# Patient Record
Sex: MALE | URBAN - METROPOLITAN AREA
[De-identification: names, ages, dates, MRNs, and addresses within clinical notes are randomized per-mention and may not be internally consistent; named-entity substitution may affect disease eponyms.]

---

## 2017-07-17 ENCOUNTER — OUTPATIENT (OUTPATIENT)
Dept: OUTPATIENT SERVICES | Facility: HOSPITAL | Age: 62
LOS: 1 days | Discharge: HOME | End: 2017-07-17

## 2017-07-17 DIAGNOSIS — I10 ESSENTIAL (PRIMARY) HYPERTENSION: ICD-10-CM

## 2017-07-17 DIAGNOSIS — E03.9 HYPOTHYROIDISM, UNSPECIFIED: ICD-10-CM

## 2017-07-17 DIAGNOSIS — E78.2 MIXED HYPERLIPIDEMIA: ICD-10-CM

## 2017-07-17 DIAGNOSIS — D64.9 ANEMIA, UNSPECIFIED: ICD-10-CM

## 2017-07-17 PROBLEM — Z00.00 ENCOUNTER FOR PREVENTIVE HEALTH EXAMINATION: Status: ACTIVE | Noted: 2017-07-17

## 2017-08-04 ENCOUNTER — APPOINTMENT (OUTPATIENT)
Dept: UROLOGY | Facility: CLINIC | Age: 62
End: 2017-08-04
Payer: COMMERCIAL

## 2017-08-04 VITALS
BODY MASS INDEX: 30.78 KG/M2 | HEIGHT: 70 IN | DIASTOLIC BLOOD PRESSURE: 72 MMHG | WEIGHT: 215 LBS | HEART RATE: 60 BPM | SYSTOLIC BLOOD PRESSURE: 140 MMHG

## 2017-08-04 DIAGNOSIS — Z78.9 OTHER SPECIFIED HEALTH STATUS: ICD-10-CM

## 2017-08-04 DIAGNOSIS — Z80.3 FAMILY HISTORY OF MALIGNANT NEOPLASM OF BREAST: ICD-10-CM

## 2017-08-04 DIAGNOSIS — E03.9 HYPOTHYROIDISM, UNSPECIFIED: ICD-10-CM

## 2017-08-04 DIAGNOSIS — E78.00 PURE HYPERCHOLESTEROLEMIA, UNSPECIFIED: ICD-10-CM

## 2017-08-04 DIAGNOSIS — I10 ESSENTIAL (PRIMARY) HYPERTENSION: ICD-10-CM

## 2017-08-04 DIAGNOSIS — Z82.49 FAMILY HISTORY OF ISCHEMIC HEART DISEASE AND OTHER DISEASES OF THE CIRCULATORY SYSTEM: ICD-10-CM

## 2017-08-04 DIAGNOSIS — Z80.52 FAMILY HISTORY OF MALIGNANT NEOPLASM OF BLADDER: ICD-10-CM

## 2017-08-04 PROCEDURE — 99242 OFF/OP CONSLTJ NEW/EST SF 20: CPT

## 2017-08-04 RX ORDER — CARVEDILOL 3.12 MG/1
3.12 TABLET, FILM COATED ORAL
Qty: 180 | Refills: 0 | Status: ACTIVE | COMMUNITY
Start: 2017-05-09

## 2017-08-04 RX ORDER — ATORVASTATIN CALCIUM 10 MG/1
10 TABLET, FILM COATED ORAL
Qty: 90 | Refills: 0 | Status: ACTIVE | COMMUNITY
Start: 2017-05-09

## 2017-08-04 RX ORDER — VALSARTAN 160 MG/1
160 TABLET, COATED ORAL
Qty: 90 | Refills: 0 | Status: ACTIVE | COMMUNITY
Start: 2017-05-09

## 2017-08-04 RX ORDER — LEVOTHYROXINE SODIUM 150 UG/1
150 TABLET ORAL
Qty: 90 | Refills: 0 | Status: ACTIVE | COMMUNITY
Start: 2017-05-09

## 2017-08-10 ENCOUNTER — OTHER (OUTPATIENT)
Age: 62
End: 2017-08-10

## 2017-08-11 ENCOUNTER — OUTPATIENT (OUTPATIENT)
Dept: OUTPATIENT SERVICES | Facility: HOSPITAL | Age: 62
LOS: 1 days | Discharge: HOME | End: 2017-08-11

## 2017-08-11 DIAGNOSIS — N62 HYPERTROPHY OF BREAST: ICD-10-CM

## 2017-08-11 DIAGNOSIS — N50.0 ATROPHY OF TESTIS: ICD-10-CM

## 2017-08-11 DIAGNOSIS — R97.20 ELEVATED PROSTATE SPECIFIC ANTIGEN [PSA]: ICD-10-CM

## 2017-08-25 ENCOUNTER — OUTPATIENT (OUTPATIENT)
Dept: OUTPATIENT SERVICES | Facility: HOSPITAL | Age: 62
LOS: 1 days | Discharge: HOME | End: 2017-08-25

## 2017-08-25 DIAGNOSIS — R97.20 ELEVATED PROSTATE SPECIFIC ANTIGEN [PSA]: ICD-10-CM

## 2017-10-17 ENCOUNTER — OTHER (OUTPATIENT)
Age: 62
End: 2017-10-17

## 2017-10-18 ENCOUNTER — APPOINTMENT (OUTPATIENT)
Dept: UROLOGY | Facility: CLINIC | Age: 62
End: 2017-10-18
Payer: COMMERCIAL

## 2017-10-18 VITALS
DIASTOLIC BLOOD PRESSURE: 69 MMHG | HEIGHT: 70 IN | HEART RATE: 64 BPM | BODY MASS INDEX: 30.78 KG/M2 | SYSTOLIC BLOOD PRESSURE: 132 MMHG | WEIGHT: 215 LBS

## 2017-10-18 DIAGNOSIS — N50.0 ATROPHY OF TESTIS: ICD-10-CM

## 2017-10-18 DIAGNOSIS — N62 HYPERTROPHY OF BREAST: ICD-10-CM

## 2017-10-18 DIAGNOSIS — R97.20 ELEVATED PROSTATE, SPECIFIC ANTIGEN [PSA]: ICD-10-CM

## 2017-10-18 PROCEDURE — 99214 OFFICE O/P EST MOD 30 MIN: CPT

## 2017-10-18 NOTE — LETTER HEADER
[Camden Guaman MD] : Camden Guaman MD [Director of Urology] : Director of Urology [[address]] : [address] [[address]] : [address] [[place] [address]] : [place] [address] [Tel [phone]] : Tel [phone] [Fax [phone]] : Fax [phone]

## 2017-10-18 NOTE — LETTER BODY
[Attached please find my note.] : Attached please find my note. [Thank you very much for allowing me to participate in the care of this patient. If you have any questions, please do not hesitate to contact me] : Thank you very much for allowing me to participate in the care of this patient. If you have any questions, please do not hesitate to contact me. [FreeTextEntry2] : Pradeep Hogue MD\par [address] [place] [address]\[place] [address]

## 2017-10-18 NOTE — ASSESSMENT
[FreeTextEntry1] : At his age these labs give him a less than one in four chance of a positive biopsy and given autopsy data with the incidence of prostate cancer in 62-year-old man to be somewhere in the neighborhood of 50% if not more. Ergo, I question whether it’s worth doing. This is especially so as the PSA prior to this he tells me was 4.7.\par \par The question however is his hypogonadism. That does aggravate any degree a metabolic syndrome and can cause osteopenia or osteoporosis. When I’m going to do especially as if there is a risk of prostate cancer having hypogonadism is one more difficult to treat and two may obfuscate the issue is repeat the labs to see if it’s real. If it is I will get a bone scan to see if there is any evidence of osteopenia. If it is real and especially if there is evidence of osteopenia leading me to want to make his testosterone into the normal range I would ask him to see my partner Dr. De Leon who is our uro- oncologist to see if a prostate biopsy is indicated or could we start testosterone replacement and follow the PSA and see if it changes.\par \par An additional issue is the mall bladder wall thickening with a 40 g prostate. Theoretically we could start him on five alpha reductase inhibitors. One it may give us a vector change in the PSA and if it does would lead us to be more comfortable that this is benign PSA not cancerous. Two, it may actually help his urination and he may not realize how bad it is until we make it better. If we want to consider that I would consider doing a noninvasive evaluation including a record of his intake and output a flow study and checking his post void residual and consider alpha blockade as well. These will be predicated on the findings on the repeat bloods and what we decide to do.

## 2017-10-18 NOTE — HISTORY OF PRESENT ILLNESS
[FreeTextEntry1] : Diego was seen in August for a PSA that was 4.7. He has no history of urologic problems including erections and voiding but had had a urinary tract infection about a year and a half ago. In a family history of bladder cancer Given the above, the testicular atrophy and the mild gynecomastia we sent him for blood and urine tests and if the labs are normal he was going to get a CAT scan. He is here today to review the results and see what if anything needs to be done.

## 2017-10-18 NOTE — PHYSICAL EXAM
[General Appearance - Well Developed] : well developed [General Appearance - Well Nourished] : well nourished [Normal Appearance] : normal appearance [Well Groomed] : well groomed [General Appearance - In No Acute Distress] : no acute distress [] : no respiratory distress [Respiration, Rhythm And Depth] : normal respiratory rhythm and effort [Exaggerated Use Of Accessory Muscles For Inspiration] : no accessory muscle use [Oriented To Time, Place, And Person] : oriented to person, place, and time [Affect] : the affect was normal [Mood] : the mood was normal [Not Anxious] : not anxious [Normal Station and Gait] : the gait and station were normal for the patient's age

## 2017-10-18 NOTE — ADDENDUM
[FreeTextEntry1] : Diego understands the need for colonoscopy will be discussing with his primary care physician, we will come up with the portal and he confirmed that he is a non-smoker\par

## 2017-11-08 ENCOUNTER — OUTPATIENT (OUTPATIENT)
Dept: OUTPATIENT SERVICES | Facility: HOSPITAL | Age: 62
LOS: 1 days | Discharge: HOME | End: 2017-11-08

## 2017-11-08 DIAGNOSIS — D64.9 ANEMIA, UNSPECIFIED: ICD-10-CM

## 2017-11-08 DIAGNOSIS — E78.5 HYPERLIPIDEMIA, UNSPECIFIED: ICD-10-CM

## 2017-11-08 DIAGNOSIS — E03.9 HYPOTHYROIDISM, UNSPECIFIED: ICD-10-CM

## 2017-11-08 DIAGNOSIS — R97.20 ELEVATED PROSTATE SPECIFIC ANTIGEN [PSA]: ICD-10-CM

## 2017-11-08 DIAGNOSIS — E29.1 TESTICULAR HYPOFUNCTION: ICD-10-CM

## 2017-12-07 ENCOUNTER — OUTPATIENT (OUTPATIENT)
Dept: OUTPATIENT SERVICES | Facility: HOSPITAL | Age: 62
LOS: 1 days | Discharge: HOME | End: 2017-12-07

## 2017-12-07 DIAGNOSIS — D64.4 CONGENITAL DYSERYTHROPOIETIC ANEMIA: ICD-10-CM

## 2017-12-07 DIAGNOSIS — E03.4 ATROPHY OF THYROID (ACQUIRED): ICD-10-CM

## 2017-12-12 ENCOUNTER — OUTPATIENT (OUTPATIENT)
Dept: OUTPATIENT SERVICES | Facility: HOSPITAL | Age: 62
LOS: 1 days | Discharge: HOME | End: 2017-12-12

## 2017-12-14 DIAGNOSIS — Z12.11 ENCOUNTER FOR SCREENING FOR MALIGNANT NEOPLASM OF COLON: ICD-10-CM

## 2017-12-14 DIAGNOSIS — K57.30 DIVERTICULOSIS OF LARGE INTESTINE WITHOUT PERFORATION OR ABSCESS WITHOUT BLEEDING: ICD-10-CM

## 2017-12-14 DIAGNOSIS — I10 ESSENTIAL (PRIMARY) HYPERTENSION: ICD-10-CM

## 2017-12-14 DIAGNOSIS — E78.00 PURE HYPERCHOLESTEROLEMIA, UNSPECIFIED: ICD-10-CM

## 2018-07-28 PROBLEM — Z78.9 ALCOHOL USE: Status: ACTIVE | Noted: 2017-08-04
